# Patient Record
Sex: MALE | Race: BLACK OR AFRICAN AMERICAN | NOT HISPANIC OR LATINO
[De-identification: names, ages, dates, MRNs, and addresses within clinical notes are randomized per-mention and may not be internally consistent; named-entity substitution may affect disease eponyms.]

---

## 2020-05-12 PROBLEM — Z00.00 ENCOUNTER FOR PREVENTIVE HEALTH EXAMINATION: Status: ACTIVE | Noted: 2020-05-12

## 2020-05-20 PROBLEM — Z78.9 SOCIAL ALCOHOL USE: Status: ACTIVE | Noted: 2020-05-20

## 2020-05-26 ENCOUNTER — APPOINTMENT (OUTPATIENT)
Dept: UROLOGY | Facility: CLINIC | Age: 20
End: 2020-05-26
Payer: COMMERCIAL

## 2020-05-26 DIAGNOSIS — Z78.9 OTHER SPECIFIED HEALTH STATUS: ICD-10-CM

## 2020-06-16 ENCOUNTER — APPOINTMENT (OUTPATIENT)
Dept: UROLOGY | Facility: CLINIC | Age: 20
End: 2020-06-16
Payer: COMMERCIAL

## 2020-06-16 VITALS
HEIGHT: 70 IN | TEMPERATURE: 98 F | BODY MASS INDEX: 35.79 KG/M2 | HEART RATE: 87 BPM | DIASTOLIC BLOOD PRESSURE: 82 MMHG | WEIGHT: 250 LBS | SYSTOLIC BLOOD PRESSURE: 131 MMHG

## 2020-06-16 DIAGNOSIS — Z80.9 FAMILY HISTORY OF MALIGNANT NEOPLASM, UNSPECIFIED: ICD-10-CM

## 2020-06-16 DIAGNOSIS — Z83.3 FAMILY HISTORY OF DIABETES MELLITUS: ICD-10-CM

## 2020-06-16 PROCEDURE — 99204 OFFICE O/P NEW MOD 45 MIN: CPT

## 2020-06-16 NOTE — ASSESSMENT
[FreeTextEntry1] : In summary 19 year old male with persistent painless gross hematuria since Dec 2019. Work up included abdominal angiogram (report not available), negative CT stone protocol, cysto positive for blood at left UCO. No hx of stone disease, not sexually active.\par \par 1. Obtain UA, UCx today\par 2. Schedule CT Angiogram A/P, CT Urogram studies\par 3. Serum CBC and BMP today \par 4. F/U to discuss results, review images and finalize clinical plan.\par \par Thank you very much for allowing me to assist in the care of this patient. Should you have any additional questions or concerns please do not hesitate to contact me.\par \par Sincerely,\par \par Ab Bolivar D.O.\par  of Urology and Radiology\par  of Urology at [facility]\par System Director for Prostate Cancer\par [facility] [address] [place] [address], [address]\par Phone: [phone]\par

## 2020-06-16 NOTE — HISTORY OF PRESENT ILLNESS
[FreeTextEntry1] : Dear Dr. Val Garcia (Urologist at [facility])\par \par Thank you for your referral.\par \par Chief Complaint: painless, gross hematuria\par Date of first visit: 05/26/2020\par Occupation: Student CS\par \par CASE VELAZQUEZ  is a 19 year old AA male ~ who presents for an evaluation for painless gross hematuria. \par He reports that while he was in [place], he had spontaneous gross hematuria, characterized by red and dark brown color, since mid Dec 2019. He had a work up locally that included angiogram (negative) and cystoscopy revealed "blood at left UO." He saw Urologist in [place] who recommended to have Ureteroscopy on the left, but due to Corona distancing and precautions, this was placed on hold. He also saw a local urologist her referred him here.\par \par Pt underwent Bladder, Prostate and Kidney U/S (12/2019), which reported PVR 5 cc, 11.2 cc prostate volume and normal size kidneys. In addition, he had a CT stone A/P scan from 12/20/2019, which reported no urinary calculi or hydro. He recalls angiogram, but no details or report available. He does not recall having CT Urogram.\par \par He reports still having most times light red to brown urine color with occasional small clots, less in the morning and worse in the pm hours. Denies dysuria, denies flank pain, no N/V, no recent abdominal or back blunt trauma, reports occasional suprapubic pressure. Not sexually active\par \par Denies past UTIs, kidney stones. Family hx negative.\par \par 05/26/2020 \par IPSS 1 QOL 3 \par KALYANI 5\par \par The patient denies fevers, chills, nausea and or vomiting and no unexplained weight loss.\par \par All pertinent laboratory, films and physician notes were reviewed.  Questionnaire results were discussed with patient.\par \par

## 2020-06-16 NOTE — PHYSICAL EXAM
[General Appearance - Well Developed] : well developed [General Appearance - Well Nourished] : well nourished [Normal Appearance] : normal appearance [Well Groomed] : well groomed [Heart Rate And Rhythm] : Heart rate and rhythm were normal [General Appearance - In No Acute Distress] : no acute distress [] : no respiratory distress [Abdomen Tenderness] : non-tender [Costovertebral Angle Tenderness] : no ~M costovertebral angle tenderness [Abdomen Soft] : soft [Skin Color & Pigmentation] : normal skin color and pigmentation [Normal Station and Gait] : the gait and station were normal for the patient's age [Skin Lesions] : no skin lesions [Oriented To Time, Place, And Person] : oriented to person, place, and time [Affect] : the affect was normal [Mood] : the mood was normal [Not Anxious] : not anxious [Inguinal Lymph Nodes Enlarged Bilaterally] : inguinal [Urethral Meatus] : meatus normal [Penis Abnormality] : normal circumcised penis [Scrotum] : the scrotum was normal [Epididymis] : the epididymides were normal [Testes Mass (___cm)] : there were no testicular masses [Testes Tenderness] : no tenderness of the testes [No Focal Deficits] : no focal deficits [FreeTextEntry1] : Rectal deferred

## 2020-06-17 LAB
ANION GAP SERPL CALC-SCNC: 15 MMOL/L
APPEARANCE: ABNORMAL
BACTERIA: NEGATIVE
BASOPHILS # BLD AUTO: 0.04 K/UL
BASOPHILS NFR BLD AUTO: 0.6 %
BILIRUBIN URINE: NEGATIVE
BLOOD URINE: ABNORMAL
BUN SERPL-MCNC: 11 MG/DL
CALCIUM SERPL-MCNC: 9.8 MG/DL
CHLORIDE SERPL-SCNC: 102 MMOL/L
CO2 SERPL-SCNC: 23 MMOL/L
COLOR: ABNORMAL
CREAT SERPL-MCNC: 1 MG/DL
EOSINOPHIL # BLD AUTO: 0.04 K/UL
EOSINOPHIL NFR BLD AUTO: 0.6 %
GLUCOSE QUALITATIVE U: NEGATIVE
GLUCOSE SERPL-MCNC: 89 MG/DL
HCT VFR BLD CALC: 50.2 %
HGB BLD-MCNC: 16.1 G/DL
HYALINE CASTS: 0 /LPF
IMM GRANULOCYTES NFR BLD AUTO: 0.2 %
KETONES URINE: NEGATIVE
LEUKOCYTE ESTERASE URINE: NEGATIVE
LYMPHOCYTES # BLD AUTO: 1.87 K/UL
LYMPHOCYTES NFR BLD AUTO: 29.3 %
MAN DIFF?: NORMAL
MCHC RBC-ENTMCNC: 28 PG
MCHC RBC-ENTMCNC: 32.1 GM/DL
MCV RBC AUTO: 87.3 FL
MICROSCOPIC-UA: NORMAL
MONOCYTES # BLD AUTO: 0.38 K/UL
MONOCYTES NFR BLD AUTO: 5.9 %
NEUTROPHILS # BLD AUTO: 4.05 K/UL
NEUTROPHILS NFR BLD AUTO: 63.4 %
NITRITE URINE: POSITIVE
PH URINE: 7
PLATELET # BLD AUTO: 243 K/UL
POTASSIUM SERPL-SCNC: 4.4 MMOL/L
PROTEIN URINE: ABNORMAL
RBC # BLD: 5.75 M/UL
RBC # FLD: 12.2 %
RED BLOOD CELLS URINE: >720 /HPF
SODIUM SERPL-SCNC: 141 MMOL/L
SPECIFIC GRAVITY URINE: 1.01
SQUAMOUS EPITHELIAL CELLS: 0 /HPF
UROBILINOGEN URINE: NORMAL
WBC # FLD AUTO: 6.39 K/UL
WHITE BLOOD CELLS URINE: 5 /HPF

## 2020-06-18 LAB — BACTERIA UR CULT: NORMAL

## 2020-06-24 ENCOUNTER — TRANSCRIPTION ENCOUNTER (OUTPATIENT)
Age: 20
End: 2020-06-24

## 2020-07-01 ENCOUNTER — RESULT REVIEW (OUTPATIENT)
Age: 20
End: 2020-07-01

## 2020-07-01 ENCOUNTER — OUTPATIENT (OUTPATIENT)
Dept: OUTPATIENT SERVICES | Facility: HOSPITAL | Age: 20
LOS: 1 days | End: 2020-07-01
Payer: COMMERCIAL

## 2020-07-01 ENCOUNTER — APPOINTMENT (OUTPATIENT)
Dept: CT IMAGING | Facility: HOSPITAL | Age: 20
End: 2020-07-01

## 2020-07-01 PROCEDURE — 74178 CT ABD&PLV WO CNTR FLWD CNTR: CPT | Mod: 26

## 2020-07-02 PROCEDURE — 74178 CT ABD&PLV WO CNTR FLWD CNTR: CPT

## 2020-07-27 ENCOUNTER — APPOINTMENT (OUTPATIENT)
Dept: UROLOGY | Facility: CLINIC | Age: 20
End: 2020-07-27
Payer: COMMERCIAL

## 2020-07-27 PROCEDURE — 99213 OFFICE O/P EST LOW 20 MIN: CPT | Mod: 95

## 2020-07-27 NOTE — ASSESSMENT
[FreeTextEntry1] : In summary, 19 year old male with persistent painless gross hematuria since Dec 2019 on the left side. Work up on outside included abdominal angiogram (report not available), negative, CT stone protocol negative, outside cysto reportedly positive for blood at the left UO. Recent CT Urogram with venous phase - negative. No hx of stone disease, not sexually active.\par \par No Pseudoaneurysms, No Nut Crackers, no visible filling defects associated with his long standing asymptomatic gross hematuria.\par \par 1. Cystoscopy, left ureteroscopy, possible laser, possible brush biopsy, possible selective cytology, fulguration, retrograde pyelogram, possible stent placement at [facility]. +Flouro.\par 2. Labs on outside in [place], email to patient by Rukhsana.\par 3. No Medical Clearance\par 4. Coagulation tests\par 5. Booking form opened\par 6.  Rukhsana to call patient to help set up\par \par Thank you very much for allowing me to assist in the care of this patient. Should you have any additional questions or concerns please do not hesitate to contact me.\par \par Sincerely,\par \par Ab Bolivar D.O.\par  of Urology and Radiology\par  of Urology at [facility]\par System Director for Prostate Cancer\par [address] [place] [address], [address]\par Phone: [phone]

## 2020-07-27 NOTE — HISTORY OF PRESENT ILLNESS
[Other Location: e.g. School (Enter Location, City,State)___] : at [unfilled], at the time of the visit. [FreeTextEntry1] : Chief Complaint: follow up, gross hematuria\par Date of first visit: 06/16/2020\par \par This visit was provided via telehealth using real-time 2-way audio, visual technology. The patient, CASE VELAZQUEZ, was located at home, [address] [place] [address], at the time of the visit. The provider, AARON COLES, was located at [place] at the time of the visit. The patient, CASE VELAZQUEZ, and Provider participated in the telehealth encounter.\par Verbal consent given on July 27, 2020 by the patient.\par \par The patient-doctor relationship has been established in a face to face fashion via real time video/audio HIPAA compliant communication using telemedicine software. The patient's identity has been confirmed. The patient was previously emailed a copy of the telemedicine consent. They have had a chance to review and has now given verbal consent and has requested care to be assessed and treated through telemedicine and understands there maybe limitations in this process and they may need further follow up care in the office and or hospital settings.\par \par CASE VELAZQUEZ is a 19 year old AA male, hx of painless gross hematuria, who presents for a follow up, as he was asked to undergo CT Angiogram A/P, CT Urogram, labs and then come back to discuss results, review images and finalize clinical plan.\par \par CT Urogram 07/01/2020 - Negative for stones, renal mass, hydronephrosis, ureteric calculus.\par CT Angiogram???\par \par He reported in prior visit that while he was in [place], he had spontaneous gross hematuria, characterized by red and dark brown color, since mid Dec 2019. He had a work up locally that included angiogram (negative) and cystoscopy revealed "blood at left UO." He saw Urologist in [place] who recommended to have Ureteroscopy on the left, but due to Corona distancing and precautions, this was placed on hold. He also saw a local urologist her referred him here.\par \par Pt underwent Bladder, Prostate and Kidney U/S (12/2019), which reported PVR 5 cc, 11.2 cc prostate volume and normal size kidneys. In addition, he had a CT stone A/P scan from 12/20/2019, which reported no urinary calculi or hydro. He recalls angiogram, but no details or report available. He does not recall having CT Urogram.\par \par He reported still having most times light red to brown urine color with occasional small clots, less in the morning and worse in the pm hours. Denies dysuria, denies flank pain, no N/V, no recent abdominal or back blunt trauma, reports occasional suprapubic pressure. Not sexually active\par \par Denies past UTIs, kidney stones. Family hx negative.\par \par 07/06/2020 \par IPSS [] QOL [] \par KALYANI [] \par EPIC 26 [] \par \par The patient denies fevers, chills, nausea and or vomiting and no unexplained weight loss.\par \par All pertinent laboratory, films and physician notes were reviewed.  Questionnaire results were discussed with patient.\par \par

## 2020-07-27 NOTE — PHYSICAL EXAM
[General Appearance - Well Developed] : well developed [Well Groomed] : well groomed [General Appearance - In No Acute Distress] : no acute distress [Normal Appearance] : normal appearance [Skin Color & Pigmentation] : normal skin color and pigmentation [] : no respiratory distress [Heart Rate And Rhythm] : Heart rate and rhythm were normal [Mood] : the mood was normal [Affect] : the affect was normal [Oriented To Time, Place, And Person] : oriented to person, place, and time [Not Anxious] : not anxious [No Focal Deficits] : no focal deficits

## 2020-08-19 LAB
ANION GAP SERPL CALC-SCNC: 13 MMOL/L
APTT BLD: 36.9 SEC
BASOPHILS # BLD AUTO: 0.04 K/UL
BASOPHILS NFR BLD AUTO: 0.8 %
BUN SERPL-MCNC: 10 MG/DL
CALCIUM SERPL-MCNC: 10.2 MG/DL
CHLORIDE SERPL-SCNC: 104 MMOL/L
CO2 SERPL-SCNC: 27 MMOL/L
CREAT SERPL-MCNC: 1.17 MG/DL
EOSINOPHIL # BLD AUTO: 0.04 K/UL
EOSINOPHIL NFR BLD AUTO: 0.8 %
GLUCOSE SERPL-MCNC: 94 MG/DL
HCT VFR BLD CALC: 49.9 %
HGB BLD-MCNC: 16.2 G/DL
IMM GRANULOCYTES NFR BLD AUTO: 0 %
INR PPP: 1.06 RATIO
LYMPHOCYTES # BLD AUTO: 2.12 K/UL
LYMPHOCYTES NFR BLD AUTO: 40.5 %
MAN DIFF?: NORMAL
MCHC RBC-ENTMCNC: 27.9 PG
MCHC RBC-ENTMCNC: 32.5 GM/DL
MCV RBC AUTO: 86 FL
MONOCYTES # BLD AUTO: 0.44 K/UL
MONOCYTES NFR BLD AUTO: 8.4 %
NEUTROPHILS # BLD AUTO: 2.6 K/UL
NEUTROPHILS NFR BLD AUTO: 49.5 %
PLATELET # BLD AUTO: 239 K/UL
POTASSIUM SERPL-SCNC: 4.5 MMOL/L
PT BLD: 12.5 SEC
RBC # BLD: 5.8 M/UL
RBC # FLD: 12.2 %
SODIUM SERPL-SCNC: 143 MMOL/L
WBC # FLD AUTO: 5.24 K/UL

## 2020-08-20 LAB
APPEARANCE: ABNORMAL
BACTERIA UR CULT: NORMAL
BACTERIA UR CULT: NORMAL
BACTERIA: NEGATIVE
BILIRUBIN URINE: NEGATIVE
BLOOD URINE: ABNORMAL
COLOR: ABNORMAL
GLUCOSE QUALITATIVE U: NEGATIVE
HYALINE CASTS: 0 /LPF
KETONES URINE: NEGATIVE
LEUKOCYTE ESTERASE URINE: NEGATIVE
MICROSCOPIC-UA: NORMAL
NITRITE URINE: NEGATIVE
PH URINE: 6.5
PROTEIN URINE: ABNORMAL
RED BLOOD CELLS URINE: >720 /HPF
SPECIFIC GRAVITY URINE: 1.02
SQUAMOUS EPITHELIAL CELLS: 0 /HPF
UROBILINOGEN URINE: NORMAL
VWF:RCO ACT/NOR PPP PL AGG: 70 %
WHITE BLOOD CELLS URINE: 6 /HPF

## 2020-08-26 ENCOUNTER — TRANSCRIPTION ENCOUNTER (OUTPATIENT)
Age: 20
End: 2020-08-26

## 2020-08-27 ENCOUNTER — APPOINTMENT (OUTPATIENT)
Dept: UROLOGY | Facility: AMBULATORY SURGERY CENTER | Age: 20
End: 2020-08-27

## 2020-08-27 ENCOUNTER — OUTPATIENT (OUTPATIENT)
Dept: OUTPATIENT SERVICES | Facility: HOSPITAL | Age: 20
LOS: 1 days | Discharge: HOME | End: 2020-08-27
Payer: COMMERCIAL

## 2020-08-27 PROCEDURE — 52351 CYSTOURETERO & OR PYELOSCOPE: CPT

## 2020-08-27 RX ORDER — AZTREONAM 2 G
1 VIAL (EA) INJECTION
Qty: 20 | Refills: 0
Start: 2020-08-27 | End: 2020-09-05

## 2020-09-04 NOTE — PHYSICAL EXAM
[General Appearance - Well Developed] : well developed [Normal Appearance] : normal appearance [Well Groomed] : well groomed [General Appearance - In No Acute Distress] : no acute distress [Abdomen Soft] : soft [Abdomen Tenderness] : non-tender [Skin Color & Pigmentation] : normal skin color and pigmentation [Heart Rate And Rhythm] : Heart rate and rhythm were normal [] : no respiratory distress [Oriented To Time, Place, And Person] : oriented to person, place, and time [Affect] : the affect was normal [Mood] : the mood was normal [Not Anxious] : not anxious [Normal Station and Gait] : the gait and station were normal for the patient's age [No Focal Deficits] : no focal deficits [Inguinal Lymph Nodes Enlarged Bilaterally] : inguinal

## 2020-09-08 ENCOUNTER — LABORATORY RESULT (OUTPATIENT)
Age: 20
End: 2020-09-08

## 2020-09-08 ENCOUNTER — APPOINTMENT (OUTPATIENT)
Dept: UROLOGY | Facility: CLINIC | Age: 20
End: 2020-09-08
Payer: COMMERCIAL

## 2020-09-08 PROCEDURE — 99213 OFFICE O/P EST LOW 20 MIN: CPT

## 2020-09-08 NOTE — HISTORY OF PRESENT ILLNESS
[Other Location: e.g. School (Enter Location, City,State)___] : at [unfilled], at the time of the visit. 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 The left ureter was very narrow and therefore difficult to traverse with the URS.  Therefore a stent was placed on Aug 27th.  The bleeding actually improved on 9/5/20.  The stent maybe tamponading ureteral bleeding.  \par \par CT Urogram 07/01/2020 - Negative for stones, renal mass, hydronephrosis, ureteric calculus.\par \par He reported in prior visit that while he was in [place], he had spontaneous gross hematuria, characterized by red and dark brown color, since mid Dec 2019. He had a work up locally that included angiogram (negative) and cystoscopy revealed "blood at left UO." He saw Urologist in [place] who recommended to have Ureteroscopy on the left, but due to Corona distancing and precautions, this was placed on hold. He also saw a local urologist her referred him here.\par \par Pt underwent Bladder, Prostate and Kidney U/S (12/2019), which reported PVR 5 cc, 11.2 cc prostate volume and normal size kidneys. In addition, he had a CT stone A/P scan from 12/20/2019, which reported no urinary calculi or hydro. He recalls angiogram, but no details or report available. He does not recall having CT Urogram.\par \par He reported still having most times light red to brown urine color with occasional small clots, less in the morning and worse in the pm hours. Denies dysuria, denies flank pain, no N/V, no recent abdominal or back blunt trauma, reports occasional suprapubic pressure. Not sexually active\par \par Denies past UTIs, kidney stones. Family hx negative.\par \par 09/08/2020 \par IPSS  QOL  \par KALYANI\par \par The patient denies fevers, chills, nausea and or vomiting and no unexplained weight loss.\par \par All pertinent laboratory, films and physician notes were reviewed.  Questionnaire results were discussed with patient.

## 2020-09-08 NOTE — ASSESSMENT
[FreeTextEntry1] : In summary, 19 year old male with persistent painless gross hematuria since Dec 2019 on the left side - OR 8/27 left ureteral narrowing with focal bleeding from left UO. \par \par Previous work up on outside included abdominal angiogram (report not available), negative, CT stone protocol negative, outside cysto reportedly positive for blood at the left UO. Recent CT Urogram with venous phase - negative. No hx of stone disease, not sexually active.  \par \par No Pseudoaneurysms, No Nut Crackers, no visible filling defects associated with his long standing asymptomatic gross hematuria.\par \par 1. Cystoscopy, left ureteroscopy, possible laser, possible brush biopsy, possible selective cytology, fulguration, retrograde pyelogram, possible stent placement at [facility]. +Flouro. (2nd Stage)\par 2. Labs today\par 3. No Medical Clearance\par 4. Hematology Bleeding Assessment Dr Yoni Keller and Briseida Ash [phone]\par 5. Booking form placed 8/27/20\par \par Thank you very much for allowing me to assist in the care of this patient. Should you have any additional questions or concerns please do not hesitate to contact me.\par \par Sincerely,\par \par Ab Bolivar D.O.\par  of Urology and Radiology\par  of Urology at [facility]\par System Director for Prostate Cancer\par [address] [place] [address], [address]\par Phone: [phone]

## 2020-09-14 ENCOUNTER — LABORATORY RESULT (OUTPATIENT)
Age: 20
End: 2020-09-14

## 2020-09-16 ENCOUNTER — TRANSCRIPTION ENCOUNTER (OUTPATIENT)
Age: 20
End: 2020-09-16

## 2020-09-17 ENCOUNTER — APPOINTMENT (OUTPATIENT)
Dept: UROLOGY | Facility: AMBULATORY SURGERY CENTER | Age: 20
End: 2020-09-17

## 2020-09-17 ENCOUNTER — OUTPATIENT (OUTPATIENT)
Dept: OUTPATIENT SERVICES | Facility: HOSPITAL | Age: 20
LOS: 1 days | Discharge: HOME | End: 2020-09-17
Payer: COMMERCIAL

## 2020-09-17 ENCOUNTER — RESULT REVIEW (OUTPATIENT)
Age: 20
End: 2020-09-17

## 2020-09-17 PROCEDURE — 52351 CYSTOURETERO & OR PYELOSCOPE: CPT

## 2020-09-17 PROCEDURE — 88305 TISSUE EXAM BY PATHOLOGIST: CPT | Mod: 26

## 2020-09-21 LAB — SURGICAL PATHOLOGY STUDY: SIGNIFICANT CHANGE UP

## 2020-12-22 ENCOUNTER — APPOINTMENT (OUTPATIENT)
Dept: UROLOGY | Facility: CLINIC | Age: 20
End: 2020-12-22
Payer: COMMERCIAL

## 2020-12-22 VITALS — TEMPERATURE: 98.7 F | HEART RATE: 86 BPM | SYSTOLIC BLOOD PRESSURE: 124 MMHG | DIASTOLIC BLOOD PRESSURE: 80 MMHG

## 2020-12-22 PROCEDURE — 99213 OFFICE O/P EST LOW 20 MIN: CPT

## 2020-12-22 PROCEDURE — 99072 ADDL SUPL MATRL&STAF TM PHE: CPT

## 2020-12-23 LAB
ANION GAP SERPL CALC-SCNC: 13 MMOL/L
APPEARANCE: ABNORMAL
BACTERIA: NEGATIVE
BASOPHILS # BLD AUTO: 0.05 K/UL
BASOPHILS NFR BLD AUTO: 0.8 %
BILIRUBIN URINE: NEGATIVE
BLOOD URINE: ABNORMAL
BUN SERPL-MCNC: 17 MG/DL
CALCIUM SERPL-MCNC: 10.1 MG/DL
CHLORIDE SERPL-SCNC: 105 MMOL/L
CO2 SERPL-SCNC: 24 MMOL/L
COLOR: ABNORMAL
CREAT SERPL-MCNC: 1.4 MG/DL
EOSINOPHIL # BLD AUTO: 0.09 K/UL
EOSINOPHIL NFR BLD AUTO: 1.5 %
GLUCOSE QUALITATIVE U: NEGATIVE
GLUCOSE SERPL-MCNC: 97 MG/DL
HCT VFR BLD CALC: 50.8 %
HGB BLD-MCNC: 16.2 G/DL
HYALINE CASTS: 1 /LPF
IMM GRANULOCYTES NFR BLD AUTO: 0 %
KETONES URINE: NEGATIVE
LEUKOCYTE ESTERASE URINE: NEGATIVE
LYMPHOCYTES # BLD AUTO: 2.44 K/UL
LYMPHOCYTES NFR BLD AUTO: 40.5 %
MAN DIFF?: NORMAL
MCHC RBC-ENTMCNC: 27.5 PG
MCHC RBC-ENTMCNC: 31.9 GM/DL
MCV RBC AUTO: 86.1 FL
MICROSCOPIC-UA: NORMAL
MONOCYTES # BLD AUTO: 0.42 K/UL
MONOCYTES NFR BLD AUTO: 7 %
NEUTROPHILS # BLD AUTO: 3.02 K/UL
NEUTROPHILS NFR BLD AUTO: 50.2 %
NITRITE URINE: NEGATIVE
PH URINE: 6
PLATELET # BLD AUTO: 270 K/UL
POTASSIUM SERPL-SCNC: 4.4 MMOL/L
PROTEIN URINE: ABNORMAL
RBC # BLD: 5.9 M/UL
RBC # FLD: 12.6 %
RED BLOOD CELLS URINE: 206 /HPF
SODIUM SERPL-SCNC: 141 MMOL/L
SPECIFIC GRAVITY URINE: 1.02
SQUAMOUS EPITHELIAL CELLS: 1 /HPF
UROBILINOGEN URINE: NORMAL
WBC # FLD AUTO: 6.02 K/UL
WHITE BLOOD CELLS URINE: 5 /HPF

## 2020-12-23 NOTE — HISTORY OF PRESENT ILLNESS
[Other Location: e.g. School (Enter Location, City,State)___] : at [unfilled], at the time of the visit. 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 The left ureter was very narrow and therefore difficult to traverse with the URS.  Therefore a stent was placed on Aug 27th.  The bleeding actually improved on 9/5/20.  The stent maybe tamponading ureteral bleeding, 9/17/20 stent removed after OR evaluation and no abnormalities were appreciated.  He was seen at [facility] Hematology and work up was negative for bleeding disorders.\par \par CT Urogram 07/01/2020 - Negative for stones, renal mass, hydronephrosis, ureteric calculus.\par \par He reported in prior visit that while he was in [place], he had spontaneous gross hematuria, characterized by red and dark brown color, since mid Dec 2019. He had a work up locally that included angiogram (negative) and cystoscopy revealed "blood at left UO." He saw Urologist in [place] who recommended to have Ureteroscopy on the left, but due to Corona distancing and precautions, this was placed on hold. He also saw a local urologist her referred him here.\par \par Pt underwent Bladder, Prostate and Kidney U/S (12/2019), which reported PVR 5 cc, 11.2 cc prostate volume and normal size kidneys. In addition, he had a CT stone A/P scan from 12/20/2019, which reported no urinary calculi or hydro. He recalls angiogram, but no details or report available. \par \par He reported still having most times light red to brown urine color with occasional small clots, less in the morning and worse in the pm hours. Denies dysuria, denies flank pain, no N/V, no recent abdominal or back blunt trauma, reports occasional suprapubic pressure. Not sexually active\par \par Denies past UTIs, kidney stones. Family hx negative.\par \par The patient denies fevers, chills, nausea and or vomiting and no unexplained weight loss.\par \par All pertinent laboratory, films and physician notes were reviewed.  Questionnaire results were discussed with patient.

## 2020-12-23 NOTE — ASSESSMENT
[FreeTextEntry1] : In summary, 19 year old male with persistent painless gross hematuria since Dec 2019 on the left side - OR 8/27 left ureteral narrowing with focal bleeding from left UO, 3 month follow up. \par \par Previous work up on outside included abdominal angiogram (report not available), negative, CT stone protocol negative, outside cysto reportedly positive for blood at the left UO. Recent CT Urogram with venous phase - negative. No hx of stone disease, not sexually active.  \par \par No Pseudoaneurysms, No Nut Crackers, no visible filling defects associated with his long standing asymptomatic gross hematuria.\par \par 1. CT Angio\par 2. Urine Culture / UA\par 3.  CBC\par 4. Hematology work up negative\par 5. follow up after CT.\par 6. Cr increasing Recommend Nephrology w/u with Dr. Sherman.\par \par Thank you very much for allowing me to assist in the care of this patient. Should you have any additional questions or concerns please do not hesitate to contact me.\par \par Sincerely,\par \par Ab Bolivar D.O.\par  of Urology and Radiology\par  of Urology at [facility]\par System Director for Prostate Cancer\par [address], [address]\par Phone: [phone]

## 2020-12-24 LAB — BACTERIA UR CULT: NORMAL

## 2021-01-21 ENCOUNTER — APPOINTMENT (OUTPATIENT)
Dept: CT IMAGING | Facility: HOSPITAL | Age: 21
End: 2021-01-21

## 2021-01-21 ENCOUNTER — OUTPATIENT (OUTPATIENT)
Dept: OUTPATIENT SERVICES | Facility: HOSPITAL | Age: 21
LOS: 1 days | End: 2021-01-21
Payer: COMMERCIAL

## 2021-01-21 PROCEDURE — 74175 CTA ABDOMEN W/CONTRAST: CPT

## 2021-01-21 PROCEDURE — 74175 CTA ABDOMEN W/CONTRAST: CPT | Mod: 26

## 2021-01-25 ENCOUNTER — NON-APPOINTMENT (OUTPATIENT)
Age: 21
End: 2021-01-25

## 2021-02-02 ENCOUNTER — APPOINTMENT (OUTPATIENT)
Dept: UROLOGY | Facility: CLINIC | Age: 21
End: 2021-02-02
Payer: COMMERCIAL

## 2021-02-09 ENCOUNTER — APPOINTMENT (OUTPATIENT)
Dept: UROLOGY | Facility: CLINIC | Age: 21
End: 2021-02-09
Payer: COMMERCIAL

## 2021-02-09 VITALS
DIASTOLIC BLOOD PRESSURE: 79 MMHG | SYSTOLIC BLOOD PRESSURE: 139 MMHG | OXYGEN SATURATION: 96 % | HEART RATE: 82 BPM | TEMPERATURE: 97 F

## 2021-02-09 PROCEDURE — 99213 OFFICE O/P EST LOW 20 MIN: CPT

## 2021-02-09 PROCEDURE — 99072 ADDL SUPL MATRL&STAF TM PHE: CPT

## 2021-02-09 NOTE — ASSESSMENT
[FreeTextEntry1] : In summary, 19 year old male with persistent painless gross hematuria since Dec 2019 on the left side - OR 8/27 left ureteral narrowing with focal bleeding from left UO. Previous work up on outside included abdominal angiogram (report not available), negative, CT stone protocol negative, outside cysto reportedly positive for blood at the left UO. Recent CT Urogram with venous phase - negative. No hx of stone disease, not sexually active. No Pseudoaneurysms, No Nut Crackers, no visible filling defects associated with his long standing asymptomatic gross hematuria.\par \par CT angio 1/21/21 normal study. No etiology of hematuria identified (patient was experiencing hematuria at time of scan). Pt denies hematuria x 2 weeks\par \par 1. CT Angio negative\par 2. Hematology work up negative\par 3. Cr increasing. Nephrology referral placed, w/u with Dr. Sherman. Pt aware\par 4. Can re-image in 2-5 years\par 5. Advised on return precautions\par \par Follow up PRN\par \par Thank you very much for allowing me to assist in the care of this patient. Should you have any additional questions or concerns please do not hesitate to contact me.\par \par Sincerely,\par \par Ab Bolivar D.O.\par  of Urology and Radiology\par  of Urology at [facility]\par System Director for Prostate Cancer\par [address] [place] [address], [address]\par Phone: [phone]

## 2021-02-09 NOTE — PHYSICAL EXAM
[Costovertebral Angle Tenderness] : no ~M costovertebral angle tenderness [Urinary Bladder Findings] : the bladder was normal on palpation [Edema] : no peripheral edema [Exaggerated Use Of Accessory Muscles For Inspiration] : no accessory muscle use

## 2021-02-09 NOTE — HISTORY OF PRESENT ILLNESS
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 The left ureter was very narrow and therefore difficult to traverse with the URS.  Therefore a stent was placed on Aug 27th.  The bleeding actually improved on 9/5/20.  The stent maybe tamponading ureteral bleeding, 9/17/20 stent removed after OR evaluation and no abnormalities were appreciated.  He was seen at [facility] Hematology and work up was negative for bleeding disorders.\par \par Pt denies hematuria x 2 weeks, urine is clear and yellow. CT angio 1/21/21 normal study. No etiology of hematuria identified (patient was experiencing hematuria at time of scan)\par He needs to see Dr Sherman, nephrology, for elevated Crt (1.4). Referral was put in 1/25/21 and pt is aware.\par \par CT Urogram 07/01/2020 - Negative for stones, renal mass, hydronephrosis, ureteric calculus.\par \par He reported in prior visit that while he was in [place], he had spontaneous gross hematuria, characterized by red and dark brown color, since mid Dec 2019. He had a work up locally that included angiogram (negative) and cystoscopy revealed "blood at left UO." He saw Urologist in [place] who recommended to have Ureteroscopy on the left, but due to Corona distancing and precautions, this was placed on hold. He also saw a local urologist her referred him here.\par \par Pt underwent Bladder, Prostate and Kidney U/S (12/2019), which reported PVR 5 cc, 11.2 cc prostate volume and normal size kidneys. In addition, he had a CT stone A/P scan from 12/20/2019, which reported no urinary calculi or hydro. He recalls angiogram, but no details or report available. \par \par He reported still having most times light red to brown urine color with occasional small clots, less in the morning and worse in the pm hours. Denies dysuria, denies flank pain, no N/V, no recent abdominal or back blunt trauma, reports occasional suprapubic pressure. Not sexually active\par \par Denies past UTIs, kidney stones. Family hx negative.\par \par The patient denies fevers, chills, nausea and or vomiting and no unexplained weight loss.\par \par All pertinent laboratory, films and physician notes were reviewed.  Questionnaire results were discussed with patient. [Urinary Retention] : no urinary retention [Urinary Urgency] : no urinary urgency [Urinary Frequency] : no urinary frequency [Dysuria] : no dysuria [Hematuria - Gross] : no gross hematuria [Weight Loss] : no recent ~M [unfilled] weight loss [Fever] : no fever [Fatigue] : no fatigue [Nausea] : no nausea

## 2021-03-06 ENCOUNTER — NON-APPOINTMENT (OUTPATIENT)
Age: 21
End: 2021-03-06

## 2021-03-10 ENCOUNTER — NON-APPOINTMENT (OUTPATIENT)
Age: 21
End: 2021-03-10

## 2021-03-11 ENCOUNTER — APPOINTMENT (OUTPATIENT)
Dept: NEPHROLOGY | Facility: CLINIC | Age: 21
End: 2021-03-11
Payer: COMMERCIAL

## 2021-03-11 ENCOUNTER — LABORATORY RESULT (OUTPATIENT)
Age: 21
End: 2021-03-11

## 2021-03-11 VITALS — SYSTOLIC BLOOD PRESSURE: 109 MMHG | DIASTOLIC BLOOD PRESSURE: 72 MMHG

## 2021-03-11 VITALS — TEMPERATURE: 98.5 F

## 2021-03-11 PROCEDURE — 99072 ADDL SUPL MATRL&STAF TM PHE: CPT

## 2021-03-11 PROCEDURE — 36415 COLL VENOUS BLD VENIPUNCTURE: CPT

## 2021-03-11 PROCEDURE — 99204 OFFICE O/P NEW MOD 45 MIN: CPT | Mod: 25

## 2021-03-11 NOTE — HISTORY OF PRESENT ILLNESS
[FreeTextEntry1] : Kindly referred by Dr. Bolivar for gross hematuria and elevated creatinine.\par \par * Since December 2019 he has had multiple episode of painless gross hematuria, red/dark brown, with occasional clots. Workup reportedly included angiogram which was negative. A ureteroscopy August 2020 reportedly revealed ureteral stenosis in the distal 1/3 of ureter treated with stenting. Stent removed on 9/20. CT stone and urogram were negative. No evidence for nutcracker syndrome.The patient denies exposure to chronic NSAIDs, chronic PPIs, green smoothies, creatine, or herbal supplements. The patient denies a history of kidney stones or UTIs. No HTN or DM.  No recent renal ultrasound.  No TB exposures. Both kidneys were 9 cm on ultrasound. He denies prematurity or low birth weight. \par \par * His creatinine increased from 1.00 in June 2020 to 1.3 in Sep 2020 and 1.4 in Dec 2020. Urinalysis in Dec 2020 showed RBCs with 30 protein. He had a CT scan with contrast in Jan 21 2021. No weight lifting. \par \par For 2 weeks, he continues to have blood in the urine with occasional clots. No difficulty urinating. No nocturia. He last saw Dr. Hunt 1 month ago. \par \par

## 2021-03-11 NOTE — ASSESSMENT
[FreeTextEntry1] : # Elevated creatinine from 1.0 to 1.4.\par * Recheck labs, including cystatin C to establish whether he has decreased kidney function. (Creatinine of 1.0 may be an outlier.)\par * If decreased kidney function is present, will order nuclear renal scan to exclude obstruction (without hydro) related to stricture.\par * Recheck labs and quantify proteinuria.\par * A point of care renal ultrasound using the Butterfly iQ was performed and the images were personally reviewed. (The patient understands that this was a brief study to evaluate for hydronephrosis and not a complete renal ultrasound.) The ultrasound showed no significant hydronephrosis, kidneys not echogenic. \par * Check quant gold to evaluate for genitourinary TB.\par * Therapies for kidney disease: advised other evidence-based therapies including exercise, a plant-based low-oxalate diet, and 400 mcg folic acid daily\par * The patient has been counseled that chronic kidney disease is a significant condition and regular office followup with me (at least every 3 weeks for now) is important for monitoring and their health, and that it is their responsibility to make a follow up appointment.\par * The patient has been counseled never to stop taking their medications without discussing it with me or another doctor.\par * The patient has been counseled on avoiding NSAIDs.\par * The patient has been counseled on risk of acute renal failure and instructed to immediately call and speak with me or go immediately to ER with any severe symptoms, nausea, vomiting, diarrhea, chest pain, or shortness of breath.\par * A counseling information sheet has been given (today or previously). All their questions were answered.\par \par # Gross hematuria with clots.\par * This is very likely to be related to stricture and not intrinsic kidney disease (which doesn't present with clots).\par * He will make appointment for followup with Dr. Bolivar.

## 2021-03-11 NOTE — PHYSICAL EXAM
[General Appearance - Alert] : alert [General Appearance - In No Acute Distress] : in no acute distress [Neck Appearance] : the appearance of the neck was normal [Neck Cervical Mass (___cm)] : no neck mass was observed [Jugular Venous Distention Increased] : there was no jugular-venous distention [Thyroid Diffuse Enlargement] : the thyroid was not enlarged [Thyroid Nodule] : there were no palpable thyroid nodules [Auscultation Breath Sounds / Voice Sounds] : lungs were clear to auscultation bilaterally [Heart Rate And Rhythm] : heart rate was normal and rhythm regular [Heart Sounds] : normal S1 and S2 [Heart Sounds Gallop] : no gallops [Murmurs] : no murmurs [Heart Sounds Pericardial Friction Rub] : no pericardial rub [Edema] : there was no peripheral edema [Bowel Sounds] : normal bowel sounds [Abdomen Soft] : soft [Abdomen Tenderness] : non-tender [] : no hepato-splenomegaly [Abdomen Mass (___ Cm)] : no abdominal mass palpated [Cervical Lymph Nodes Enlarged Posterior Bilaterally] : posterior cervical [Cervical Lymph Nodes Enlarged Anterior Bilaterally] : anterior cervical [Supraclavicular Lymph Nodes Enlarged Bilaterally] : supraclavicular

## 2021-03-15 NOTE — PHYSICAL EXAM
[General Appearance - Well Developed] : well developed [Normal Appearance] : normal appearance [Well Groomed] : well groomed [General Appearance - In No Acute Distress] : no acute distress [Abdomen Soft] : soft [Abdomen Tenderness] : non-tender [Costovertebral Angle Tenderness] : no ~M costovertebral angle tenderness [Urinary Bladder Findings] : the bladder was normal on palpation [Skin Color & Pigmentation] : normal skin color and pigmentation [Edema] : no peripheral edema [] : no respiratory distress [Exaggerated Use Of Accessory Muscles For Inspiration] : no accessory muscle use [Oriented To Time, Place, And Person] : oriented to person, place, and time [Affect] : the affect was normal [Mood] : the mood was normal [Not Anxious] : not anxious [Normal Station and Gait] : the gait and station were normal for the patient's age [No Focal Deficits] : no focal deficits [Inguinal Lymph Nodes Enlarged Bilaterally] : inguinal

## 2021-03-16 ENCOUNTER — APPOINTMENT (OUTPATIENT)
Dept: UROLOGY | Facility: CLINIC | Age: 21
End: 2021-03-16
Payer: COMMERCIAL

## 2021-03-16 VITALS
DIASTOLIC BLOOD PRESSURE: 76 MMHG | SYSTOLIC BLOOD PRESSURE: 122 MMHG | TEMPERATURE: 98.2 F | WEIGHT: 240 LBS | HEART RATE: 79 BPM | BODY MASS INDEX: 34.36 KG/M2 | HEIGHT: 70 IN

## 2021-03-16 DIAGNOSIS — Z87.09 PERSONAL HISTORY OF OTHER DISEASES OF THE RESPIRATORY SYSTEM: ICD-10-CM

## 2021-03-16 PROCEDURE — 99072 ADDL SUPL MATRL&STAF TM PHE: CPT

## 2021-03-16 PROCEDURE — 99214 OFFICE O/P EST MOD 30 MIN: CPT

## 2021-03-20 LAB
25(OH)D3 SERPL-MCNC: 8.1 NG/ML
ALBUMIN SERPL ELPH-MCNC: 4.6 G/DL
ALP BLD-CCNC: 74 U/L
ALT SERPL-CCNC: 85 U/L
ANION GAP SERPL CALC-SCNC: 12 MMOL/L
APPEARANCE: ABNORMAL
AST SERPL-CCNC: 40 U/L
BASOPHILS # BLD AUTO: 0.05 K/UL
BASOPHILS NFR BLD AUTO: 0.7 %
BILIRUB SERPL-MCNC: 0.8 MG/DL
BILIRUBIN URINE: NEGATIVE
BLOOD URINE: ABNORMAL
BUN SERPL-MCNC: 15 MG/DL
CALCIUM SERPL-MCNC: 9.3 MG/DL
CALCIUM SERPL-MCNC: 9.3 MG/DL
CHLORIDE SERPL-SCNC: 105 MMOL/L
CHOLEST SERPL-MCNC: 159 MG/DL
CO2 SERPL-SCNC: 25 MMOL/L
COLOR: YELLOW
CREAT SERPL-MCNC: 1.11 MG/DL
CREAT SPEC-SCNC: 163 MG/DL
CREAT SPEC-SCNC: 163 MG/DL
CREAT/PROT UR: 0.1 RATIO
CYSTATIN C SERPL-MCNC: 0.68 MG/L
EOSINOPHIL # BLD AUTO: 0.07 K/UL
EOSINOPHIL NFR BLD AUTO: 1 %
ESTIMATED AVERAGE GLUCOSE: 111 MG/DL
FERRITIN SERPL-MCNC: 66 NG/ML
GFR/BSA.PRED SERPLBLD CYS-BASED-ARV: 132 ML/MIN
GLUCOSE QUALITATIVE U: NEGATIVE
GLUCOSE SERPL-MCNC: 90 MG/DL
HBA1C MFR BLD HPLC: 5.5 %
HCT VFR BLD CALC: 50 %
HDLC SERPL-MCNC: 36 MG/DL
HGB BLD-MCNC: 16.5 G/DL
IMM GRANULOCYTES NFR BLD AUTO: 0 %
KETONES URINE: NEGATIVE
LDLC SERPL CALC-MCNC: 106 MG/DL
LEUKOCYTE ESTERASE URINE: NEGATIVE
LYMPHOCYTES # BLD AUTO: 2.39 K/UL
LYMPHOCYTES NFR BLD AUTO: 35.3 %
M TB IFN-G BLD-IMP: NEGATIVE
MAN DIFF?: NORMAL
MCHC RBC-ENTMCNC: 28.2 PG
MCHC RBC-ENTMCNC: 33 GM/DL
MCV RBC AUTO: 85.3 FL
MICROALBUMIN 24H UR DL<=1MG/L-MCNC: 2.4 MG/DL
MICROALBUMIN/CREAT 24H UR-RTO: 15 MG/G
MONOCYTES # BLD AUTO: 0.56 K/UL
MONOCYTES NFR BLD AUTO: 8.3 %
NEUTROPHILS # BLD AUTO: 3.71 K/UL
NEUTROPHILS NFR BLD AUTO: 54.7 %
NITRITE URINE: NEGATIVE
NONHDLC SERPL-MCNC: 123 MG/DL
PARATHYROID HORMONE INTACT: 23 PG/ML
PH URINE: 7.5
PHOSPHATE SERPL-MCNC: 2.9 MG/DL
PLATELET # BLD AUTO: 218 K/UL
POTASSIUM SERPL-SCNC: 4.3 MMOL/L
PROT SERPL-MCNC: 7.5 G/DL
PROT UR-MCNC: 12 MG/DL
PROTEIN URINE: NORMAL
QUANTIFERON TB PLUS MITOGEN MINUS NIL: 8.28 IU/ML
QUANTIFERON TB PLUS NIL: 0.01 IU/ML
QUANTIFERON TB PLUS TB1 MINUS NIL: 0.01 IU/ML
QUANTIFERON TB PLUS TB2 MINUS NIL: 0 IU/ML
RBC # BLD: 5.86 M/UL
RBC # FLD: 13 %
SODIUM SERPL-SCNC: 142 MMOL/L
SPECIFIC GRAVITY URINE: 1.02
TRIGL SERPL-MCNC: 88 MG/DL
TSH SERPL-ACNC: 1.81 UIU/ML
UROBILINOGEN URINE: NORMAL
VIT B12 SERPL-MCNC: 818 PG/ML
WBC # FLD AUTO: 6.78 K/UL

## 2021-03-28 NOTE — HISTORY OF PRESENT ILLNESS
[FreeTextEntry1] : Chief Complaint: follow up post surgery, hx gross hematuria\par Date of first visit: 06/16/2020\par \par s/p Ureteroscopy 09/17, which revealed a several centimeter long area of ureteral stenosis at the distal one-third of the ureter.\par \par CASE VELAZQUEZ is a 19 year old AA male, hx of painless gross hematuria with occasional clots. I still feel with limited evidences this is position venous compression issue and that a stent help stopped the bleeding may support this given a low pressure bleed vs arterial or a mass.\par \par Key Clinical Facts.\par never blood on waking but occurs 3-4 hours later then is there all day.  \par Clots are yellow with clots or red clots\par Creatinine has returned to baseline.\par s/p left ureteroscopy (negative) - only finding is narrowed ureter but not obstructed 9/25/20 stent for passive dilation to help with URS placment.  Bleeding stopped with a stent.\par hematology workup negative for coagulopathies.\par \par CT angio 1/21/21 normal study. No etiology of hematuria identified (patient was experiencing hematuria at time of scan)\par CT Urogram 07/01/2020 - Negative for stones, renal mass, hydronephrosis, ureteric calculus.\par \par He initially in prior visit that while he was in [place], he had spontaneous gross hematuria, characterized by red and dark brown color, since mid Dec 2019. He had a work up locally that included angiogram (negative) and cystoscopy revealed "blood at left UO." He saw Urologist in [place] who recommended to have Ureteroscopy on the left, but due to Corona distancing and precautions, this was placed on hold. He also saw a local urologist her referred him here.\par \par Pt underwent Bladder, Prostate and Kidney U/S (12/2019), which reported PVR 5 cc, 11.2 cc prostate volume and normal size kidneys. In addition, he had a CT stone A/P scan from 12/20/2019, which reported no urinary calculi or hydro. He recalls angiogram, but no details or report available. \par \par He reported still having most times light red to brown urine color with occasional small clots, less in the morning and worse in the pm hours. Denies dysuria, denies flank pain, no N/V, no recent abdominal or back blunt trauma, reports occasional suprapubic pressure. Not sexually active\par \par Denies past UTIs, kidney stones. Family hx negative.\par \par The patient denies fevers, chills, nausea and or vomiting and no unexplained weight loss.\par \par All pertinent laboratory, films and physician notes were reviewed.  Questionnaire results were discussed with patient. [Urinary Retention] : no urinary retention [Urinary Urgency] : no urinary urgency [Urinary Frequency] : no urinary frequency [Dysuria] : no dysuria [Hematuria - Gross] : no gross hematuria [Fever] : no fever [Fatigue] : no fatigue [Nausea] : no nausea

## 2021-03-28 NOTE — ASSESSMENT
[FreeTextEntry1] : In summary, 19 year old male with persistent painless gross hematuria since Dec 2019 on the left side - OR 8/27 left ureteral narrowing with focal bleeding from left UO. Previous work up on outside included abdominal angiogram (report not available), negative, CT stone protocol negative, outside cysto reportedly positive for blood at the left UO. Recent CT Urogram with venous phase - negative. No hx of stone disease, not sexually active. No Pseudoaneurysms, No Nut Crackers, no visible filling defects associated with his long standing asymptomatic gross hematuria.\par \par CT angio 1/21/21 normal study. No etiology of hematuria identified (patient was experiencing hematuria at time of scan). Pt denies hematuria x 2 weeks\par \par 1. CT Angio negative\par 2. Hematology work up negative\par 3. Cr is stable\par 4. Can re-image in 2-5 years\par 5. NN RBC scan\par \par case discussed directly with LARISSA Blankenship and alessandro Sherman\par  \par Follow up PRN\par \par Thank you very much for allowing me to assist in the care of this patient. Should you have any additional questions or concerns please do not hesitate to contact me.\par \par Sincerely,\par \par Ab Bolivar D.O.\par  of Urology and Radiology\par  of Urology at [facility]\par System Director for Prostate Cancer\par [address] [place] [address], [address]\par Phone: [phone]

## 2021-04-02 ENCOUNTER — APPOINTMENT (OUTPATIENT)
Dept: NEPHROLOGY | Facility: CLINIC | Age: 21
End: 2021-04-02
Payer: COMMERCIAL

## 2021-04-02 VITALS — HEART RATE: 79 BPM | DIASTOLIC BLOOD PRESSURE: 68 MMHG | SYSTOLIC BLOOD PRESSURE: 108 MMHG

## 2021-04-02 PROCEDURE — 99072 ADDL SUPL MATRL&STAF TM PHE: CPT

## 2021-04-02 PROCEDURE — 99213 OFFICE O/P EST LOW 20 MIN: CPT

## 2021-04-02 NOTE — ASSESSMENT
[FreeTextEntry1] : # Gross hematuria of unclear cause.\par * Being evaluated for occult nutcracker syndrome with nuclear renal scan and standing dopplers.\par * No evidence of intrarenal cause and this is unlikely given clots.\par * Follow up within 1 - 2 months.\par \par

## 2021-04-02 NOTE — HISTORY OF PRESENT ILLNESS
[FreeTextEntry1] : Kindly referred by Dr. Bolivar for gross hematuria and elevated creatinine.\par \par * Intermittent gross hematuria with clots. No pain. * Creatinine normalized, decreased to 1.11. eGFR 132. \par \par Previous history (11Mar21): * Since December 2019 he has had multiple episode of painless gross hematuria, red/dark brown, with occasional clots. Workup reportedly included angiogram which was negative. A ureteroscopy August 2020 reportedly revealed ureteral stenosis in the distal 1/3 of ureter treated with stenting. Stent removed on 9/20. CT stone and urogram were negative. No evidence for nutcracker syndrome.The patient denies exposure to chronic NSAIDs, chronic PPIs, green smoothies, creatine, or herbal supplements. The patient denies a history of kidney stones or UTIs. No HTN or DM.  No recent renal ultrasound.  No TB exposures. Both kidneys were 9 cm on ultrasound. He denies prematurity or low birth weight. \par \par * His creatinine increased from 1.00 in June 2020 to 1.3 in Sep 2020 and 1.4 in Dec 2020. Urinalysis in Dec 2020 showed RBCs with 30 protein. He had a CT scan with contrast in Jan 21 2021. No weight lifting. \par \par For 2 weeks, he continues to have blood in the urine with occasional clots. No difficulty urinating. No nocturia. He last saw Dr. Hunt 1 month ago. \par \par

## 2021-04-05 ENCOUNTER — OUTPATIENT (OUTPATIENT)
Dept: OUTPATIENT SERVICES | Facility: HOSPITAL | Age: 21
LOS: 1 days | End: 2021-04-05
Payer: COMMERCIAL

## 2021-04-05 ENCOUNTER — RESULT REVIEW (OUTPATIENT)
Age: 21
End: 2021-04-05

## 2021-04-05 PROCEDURE — 78278 ACUTE GI BLOOD LOSS IMAGING: CPT | Mod: 26

## 2021-04-05 PROCEDURE — 78278 ACUTE GI BLOOD LOSS IMAGING: CPT

## 2021-04-05 PROCEDURE — A9560: CPT

## 2021-04-07 ENCOUNTER — NON-APPOINTMENT (OUTPATIENT)
Age: 21
End: 2021-04-07

## 2021-04-19 ENCOUNTER — OUTPATIENT (OUTPATIENT)
Dept: OUTPATIENT SERVICES | Facility: HOSPITAL | Age: 21
LOS: 1 days | End: 2021-04-19

## 2021-04-19 ENCOUNTER — APPOINTMENT (OUTPATIENT)
Dept: ULTRASOUND IMAGING | Facility: CLINIC | Age: 21
End: 2021-04-19
Payer: COMMERCIAL

## 2021-04-19 ENCOUNTER — RESULT REVIEW (OUTPATIENT)
Age: 21
End: 2021-04-19

## 2021-04-19 PROCEDURE — 76770 US EXAM ABDO BACK WALL COMP: CPT | Mod: 26

## 2021-04-30 ENCOUNTER — NON-APPOINTMENT (OUTPATIENT)
Age: 21
End: 2021-04-30

## 2021-05-07 ENCOUNTER — NON-APPOINTMENT (OUTPATIENT)
Age: 21
End: 2021-05-07

## 2021-05-07 NOTE — PHYSICAL EXAM
[General Appearance - Well Developed] : well developed [Abdomen Soft] : soft [Costovertebral Angle Tenderness] : no ~M costovertebral angle tenderness [Urinary Bladder Findings] : the bladder was normal on palpation [Skin Color & Pigmentation] : normal skin color and pigmentation [Edema] : no peripheral edema [] : no respiratory distress [Exaggerated Use Of Accessory Muscles For Inspiration] : no accessory muscle use [Oriented To Time, Place, And Person] : oriented to person, place, and time [Affect] : the affect was normal [Mood] : the mood was normal [Normal Station and Gait] : the gait and station were normal for the patient's age [No Focal Deficits] : no focal deficits [Inguinal Lymph Nodes Enlarged Bilaterally] : inguinal

## 2021-05-10 ENCOUNTER — APPOINTMENT (OUTPATIENT)
Dept: UROLOGY | Facility: CLINIC | Age: 21
End: 2021-05-10
Payer: COMMERCIAL

## 2021-05-10 PROCEDURE — 99213 OFFICE O/P EST LOW 20 MIN: CPT

## 2021-05-10 PROCEDURE — 99072 ADDL SUPL MATRL&STAF TM PHE: CPT

## 2021-05-11 LAB
ANION GAP SERPL CALC-SCNC: 12 MMOL/L
APPEARANCE: ABNORMAL
BACTERIA: NEGATIVE
BASOPHILS # BLD AUTO: 0.06 K/UL
BASOPHILS NFR BLD AUTO: 0.8 %
BILIRUBIN URINE: NEGATIVE
BLOOD URINE: ABNORMAL
BUN SERPL-MCNC: 11 MG/DL
CALCIUM SERPL-MCNC: 9.6 MG/DL
CHLORIDE SERPL-SCNC: 103 MMOL/L
CO2 SERPL-SCNC: 25 MMOL/L
COLOR: ABNORMAL
CREAT SERPL-MCNC: 1.04 MG/DL
EOSINOPHIL # BLD AUTO: 0.15 K/UL
EOSINOPHIL NFR BLD AUTO: 2.1 %
GLUCOSE QUALITATIVE U: NEGATIVE
GLUCOSE SERPL-MCNC: 88 MG/DL
HCT VFR BLD CALC: 46.9 %
HGB BLD-MCNC: 15.3 G/DL
HYALINE CASTS: 0 /LPF
IMM GRANULOCYTES NFR BLD AUTO: 0.3 %
KETONES URINE: NEGATIVE
LEUKOCYTE ESTERASE URINE: NEGATIVE
LYMPHOCYTES # BLD AUTO: 2.71 K/UL
LYMPHOCYTES NFR BLD AUTO: 37.4 %
MAN DIFF?: NORMAL
MCHC RBC-ENTMCNC: 27.7 PG
MCHC RBC-ENTMCNC: 32.6 GM/DL
MCV RBC AUTO: 85 FL
MICROSCOPIC-UA: NORMAL
MONOCYTES # BLD AUTO: 0.44 K/UL
MONOCYTES NFR BLD AUTO: 6.1 %
NEUTROPHILS # BLD AUTO: 3.86 K/UL
NEUTROPHILS NFR BLD AUTO: 53.3 %
NITRITE URINE: NEGATIVE
PH URINE: 6
PLATELET # BLD AUTO: 240 K/UL
POTASSIUM SERPL-SCNC: 4.3 MMOL/L
PROTEIN URINE: ABNORMAL
RBC # BLD: 5.52 M/UL
RBC # FLD: 12.4 %
RED BLOOD CELLS URINE: >720 /HPF
SODIUM SERPL-SCNC: 140 MMOL/L
SPECIFIC GRAVITY URINE: 1.02
SQUAMOUS EPITHELIAL CELLS: 1 /HPF
UROBILINOGEN URINE: NORMAL
WBC # FLD AUTO: 7.24 K/UL
WHITE BLOOD CELLS URINE: 5 /HPF

## 2021-05-12 LAB — BACTERIA UR CULT: NORMAL

## 2021-05-13 NOTE — HISTORY OF PRESENT ILLNESS
[FreeTextEntry1] : Chief Complaint: follow up post surgery, hx gross hematuria\par Date of first visit: 06/16/2020\par \par s/p Ureteroscopy 09/17, which revealed a several centimeter long area of ureteral stenosis at the distal one-third of the ureter.\par \par CASE VELAZQUEZ is a 19 year old AA male, hx of painless gross hematuria with occasional clots. I still feel with limited evidences this is position venous compression issue and that a stent help stopped the bleeding may support this given a low pressure bleed vs arterial or a mass.  The case was discussed with Drs Geo, Edgar and Phylicia.  The decision was made to instill silver nitrate solution to help stop the bleeding.  \par \par Key Clinical Facts.\par never blood on waking but occurs 3-4 hours later then is there all day.  \par Clots are yellow with clots or red clots\par Creatinine has returned to baseline.\par s/p left ureteroscopy (negative) - only finding is narrowed ureter but not obstructed 9/25/20 stent for passive dilation to help with URS placement.  Bleeding stopped with a stent.\par hematology workup negative for coagulopathies.\par \par CT angio 1/21/21 normal study. No etiology of hematuria identified (patient was experiencing hematuria at time of scan)\par CT Urogram 07/01/2020 - Negative for stones, renal mass, hydronephrosis, ureteric calculus.\par \par He initially in prior visit that while he was in [place], he had spontaneous gross hematuria, characterized by red and dark brown color, since mid Dec 2019. He had a work up locally that included angiogram (negative) and cystoscopy revealed "blood at left UO." He saw Urologist in [place] who recommended to have Ureteroscopy on the left, but due to Corona distancing and precautions, this was placed on hold. He also saw a local urologist her referred him here.\par \par Pt underwent Bladder, Prostate and Kidney U/S (12/2019), which reported PVR 5 cc, 11.2 cc prostate volume and normal size kidneys. In addition, he had a CT stone A/P scan from 12/20/2019, which reported no urinary calculi or hydro. He recalls angiogram, but no details or report available. \par \par He reported still having most times light red to brown urine color with occasional small clots, less in the morning and worse in the pm hours. Denies dysuria, denies flank pain, no N/V, no recent abdominal or back blunt trauma, reports occasional suprapubic pressure. Not sexually active\par \par Denies past UTIs, kidney stones. Family hx negative.\par \par The patient denies fevers, chills, nausea and or vomiting and no unexplained weight loss.\par \par All pertinent laboratory, films and physician notes were reviewed.  Questionnaire results were discussed with patient. [Urinary Retention] : no urinary retention [Urinary Urgency] : no urinary urgency [Urinary Frequency] : no urinary frequency [Dysuria] : no dysuria [Hematuria - Gross] : no gross hematuria [Fever] : no fever [Fatigue] : no fatigue [Nausea] : no nausea

## 2021-05-13 NOTE — ASSESSMENT
[FreeTextEntry1] : In summary, 19 year old male with persistent painless gross hematuria since Dec 2019 on the left side - OR 8/27 left ureteral narrowing with focal bleeding from left UO. Previous work up on outside included abdominal angiogram (report not available), negative, CT stone protocol negative, outside cysto reportedly positive for blood at the left UO. Recent CT Urogram with venous phase - negative. No hx of stone disease, not sexually active. No Pseudoaneurysms, No Nut Crackers, no visible filling defects associated with his long standing asymptomatic gross hematuria.\par \par CT angio 1/21/21 normal study. No etiology of hematuria identified (patient was experiencing hematuria at time of scan). Pt denies hematuria x 2 weeks\par \par 1. CT Angio negative\par 2. Hematology work up negative\par 3. Cr is stable\par 4. NN RBC scan - Negative, Doppler US left kidney negative.\par 5. refractory hematuria - LEFT Side - case discussed with team at [facility] (silver nitrate instillation and stent to avoid obstruction post treatment) stent on string - remove in office following monday\par \par No medical clearance needed/no contraindication for sedation.\par \par \par Thank you very much for allowing me to assist in the care of this patient. Should you have any additional questions or concerns please do not hesitate to contact me.\par \par Sincerely,\par \par Ab Bolivar D.O.\par  of Urology and Radiology\par  of Urology at [facility]\par System Director for Prostate Cancer\par [address], [address]\par Phone: [phone]

## 2021-05-14 ENCOUNTER — LABORATORY RESULT (OUTPATIENT)
Age: 21
End: 2021-05-14

## 2021-05-14 ENCOUNTER — APPOINTMENT (OUTPATIENT)
Dept: NEPHROLOGY | Facility: CLINIC | Age: 21
End: 2021-05-14
Payer: COMMERCIAL

## 2021-05-14 VITALS — WEIGHT: 250 LBS | TEMPERATURE: 96.6 F

## 2021-05-14 VITALS — SYSTOLIC BLOOD PRESSURE: 101 MMHG | DIASTOLIC BLOOD PRESSURE: 62 MMHG

## 2021-05-14 DIAGNOSIS — L98.9 DISORDER OF THE SKIN AND SUBCUTANEOUS TISSUE, UNSPECIFIED: ICD-10-CM

## 2021-05-14 DIAGNOSIS — R79.89 OTHER SPECIFIED ABNORMAL FINDINGS OF BLOOD CHEMISTRY: ICD-10-CM

## 2021-05-14 PROCEDURE — 99214 OFFICE O/P EST MOD 30 MIN: CPT

## 2021-05-14 PROCEDURE — 99072 ADDL SUPL MATRL&STAF TM PHE: CPT

## 2021-05-14 NOTE — ASSESSMENT
[FreeTextEntry1] : # Persistent gross hematuria of unclear cause.\par * No evidence of vascular abnormality in kidney or nutcracker syndrome.\par * The presence of clots makes it extremely unlikely that the source of the bleeding is IgA nephropathy or another type of glomerulophritis, as this almost always does not cause clots. The risks of a renal biopsy therefore outweigh the benefits.\par * Recheck U/A and urine protein. Lack of proteinuria also argues against glomerular bleeding.\par * Follow up in 4 - 6 months. \par \par # Skin lesion, possibly a lipoma.\par * Ultrasound of skin lesion to characterize. They were instructed that this referral is important for their health and that it is their responsibility to make and keep this appointment. All their questions were answered. \par * He will go to ER immediately with any significant pain or fever. \par * CT scan in January 2021 did not show any evidence for lymphadenopathy or other lesions. \par

## 2021-05-14 NOTE — PHYSICAL EXAM
[General Appearance - Alert] : alert [General Appearance - In No Acute Distress] : in no acute distress [Sclera] : the sclera and conjunctiva were normal [PERRL With Normal Accommodation] : pupils were equal in size, round, and reactive to light [Extraocular Movements] : extraocular movements were intact [Auscultation Breath Sounds / Voice Sounds] : lungs were clear to auscultation bilaterally [Heart Rate And Rhythm] : heart rate was normal and rhythm regular [Heart Sounds] : normal S1 and S2 [Heart Sounds Gallop] : no gallops [Murmurs] : no murmurs [Heart Sounds Pericardial Friction Rub] : no pericardial rub [Edema] : there was no peripheral edema [Bowel Sounds] : normal bowel sounds [Abdomen Soft] : soft [Abdomen Tenderness] : non-tender [] : no hepato-splenomegaly [Abdomen Mass (___ Cm)] : no abdominal mass palpated [Cervical Lymph Nodes Enlarged Posterior Bilaterally] : posterior cervical [Cervical Lymph Nodes Enlarged Anterior Bilaterally] : anterior cervical [Supraclavicular Lymph Nodes Enlarged Bilaterally] : supraclavicular [Axillary Lymph Nodes Enlarged Bilaterally] : axillary [Femoral Lymph Nodes Enlarged Bilaterally] : femoral [Inguinal Lymph Nodes Enlarged Bilaterally] : inguinal [FreeTextEntry1] : SUPERFICIAL RUBBERY SLIGHTLY TENDER 1 CM LEFT LOWER QUADRANT SKIN LESION WITHOUT ERYTHEMA.

## 2021-05-14 NOTE — HISTORY OF PRESENT ILLNESS
[FreeTextEntry1] : Kindly referred by Dr. Bolivar for gross hematuria.\par \par * Chronic gross hematuria with clots. No pain.No dysuria. Creatinine normalized. No evidence for nutcracker syndrome. * He noted a "bump" in his LLQ 1 week ago which is slightly tender to palpation. \par \par Previous history ([id number]): * Intermittent gross hematuria with clots. No pain. * Creatinine normalized, decreased to 1.11. eGFR 132. \par \par Previous history (11Mar21): * Since December 2019 he has had multiple episode of painless gross hematuria, red/dark brown, with occasional clots. Workup reportedly included angiogram which was negative. A ureteroscopy August 2020 reportedly revealed ureteral stenosis in the distal 1/3 of ureter treated with stenting. Stent removed on 9/20. CT stone and urogram were negative. No evidence for nutcracker syndrome.The patient denies exposure to chronic NSAIDs, chronic PPIs, green smoothies, creatine, or herbal supplements. The patient denies a history of kidney stones or UTIs. No HTN or DM.  No recent renal ultrasound.  No TB exposures. Both kidneys were 9 cm on ultrasound. He denies prematurity or low birth weight. \par \par * His creatinine increased from 1.00 in June 2020 to 1.3 in Sep 2020 and 1.4 in Dec 2020. Urinalysis in Dec 2020 showed RBCs with 30 protein. He had a CT scan with contrast in Jan 21 2021. No weight lifting. \par \par For 2 weeks, he continues to have blood in the urine with occasional clots. No difficulty urinating. No nocturia. He last saw Dr. Hunt 1 month ago. \par \par

## 2021-05-16 LAB
APPEARANCE: ABNORMAL
BILIRUBIN URINE: ABNORMAL
BLOOD URINE: ABNORMAL
COLOR: ABNORMAL
GLUCOSE QUALITATIVE U: NEGATIVE
KETONES URINE: NEGATIVE
LEUKOCYTE ESTERASE URINE: NEGATIVE
NITRITE URINE: POSITIVE
PH URINE: 6.5
PROTEIN URINE: ABNORMAL
SPECIFIC GRAVITY URINE: 1.02
UROBILINOGEN URINE: NORMAL

## 2021-05-24 ENCOUNTER — LABORATORY RESULT (OUTPATIENT)
Age: 21
End: 2021-05-24

## 2021-05-26 ENCOUNTER — TRANSCRIPTION ENCOUNTER (OUTPATIENT)
Age: 21
End: 2021-05-26

## 2021-05-27 ENCOUNTER — OUTPATIENT (OUTPATIENT)
Dept: OUTPATIENT SERVICES | Facility: HOSPITAL | Age: 21
LOS: 1 days | Discharge: HOME | End: 2021-05-27
Payer: COMMERCIAL

## 2021-05-27 ENCOUNTER — APPOINTMENT (OUTPATIENT)
Dept: UROLOGY | Facility: AMBULATORY SURGERY CENTER | Age: 21
End: 2021-05-27

## 2021-05-27 PROCEDURE — 52332 CYSTOSCOPY AND TREATMENT: CPT | Mod: LT

## 2021-05-27 NOTE — BRIEF OPERATIVE NOTE - NSICDXBRIEFPROCEDURE_GEN_ALL_CORE_FT
PROCEDURES:  Cystoscopy with retrograde pyelography with insertion of left ureteral stent 27-May-2021 14:08:01  Maury Sandoval

## 2021-05-27 NOTE — BRIEF OPERATIVE NOTE - OPERATION/FINDINGS
Gross hematuria seen emanating from left ureteral orifice; clear yellow urine from right ureteral orifice. Silver nitrate instillation of 7 minutes into left upper tract. 6-24 left ureteral stent.

## 2021-05-28 ENCOUNTER — NON-APPOINTMENT (OUTPATIENT)
Age: 21
End: 2021-05-28

## 2021-06-01 ENCOUNTER — APPOINTMENT (OUTPATIENT)
Dept: UROLOGY | Facility: CLINIC | Age: 21
End: 2021-06-01
Payer: COMMERCIAL

## 2021-06-01 VITALS — SYSTOLIC BLOOD PRESSURE: 122 MMHG | DIASTOLIC BLOOD PRESSURE: 82 MMHG | TEMPERATURE: 98 F

## 2021-06-01 DIAGNOSIS — R31.0 GROSS HEMATURIA: ICD-10-CM

## 2021-06-01 PROCEDURE — 99024 POSTOP FOLLOW-UP VISIT: CPT

## 2021-06-01 NOTE — ASSESSMENT
[FreeTextEntry1] : In summary, 20 year old male with persistent painless gross hematuria since Dec 2019 on the left side - OR 8/27 left ureteral narrowing with focal bleeding from left UO. Previous work up on outside included abdominal angiogram (report not available), negative, CT stone protocol negative, outside cysto reportedly positive for blood at the left UO. Recent CT Urogram with venous phase - negative. No hx of stone disease, not sexually active. No Pseudoaneurysms, No Nut Crackers, no visible filling defects associated with his long standing asymptomatic gross hematuria. He is  s/p silver nitrate instillation of left side/ureteral stent placement, presents for removal. He is still bleeding postop, urine described as brown but improving.\par \par 1. Stent (on a string) removed without complications\par 2. Encouraged hydration\par 3. CT Angio negative (patient was experiencing hematuria at time of scan)\par 4. Hematology work up negative\par 5. Cr is stable\par 6. NN RBC scan - Negative, Doppler US left kidney negative.\par 7. refractory hematuria - LEFT Side - case discussed with team at [facility] (silver nitrate instillation and stent to avoid obstruction post treatment)\par \par Follow up 1 month\par Advised on return precautions\par \par \par Thank you very much for allowing me to assist in the care of this patient. Should you have any additional questions or concerns please do not hesitate to contact me.\par \par Sincerely,\par \par Ab Bolivar D.O.\par  of Urology and Radiology\par  of Urology at [facility]\par System Director for Prostate Cancer\par [address], [address]\par Phone: [phone]

## 2021-06-01 NOTE — HISTORY OF PRESENT ILLNESS
[FreeTextEntry1] : Chief Complaint: stent removal, hx gross hematuria\par Date of first visit: 06/16/2020\par \par \par CASE VELAZQUEZ is a 20 year old AA male with hx of painless gross hematuria x 1.5 years with occasional clots who is s/p cystourethroscopy with instillation of silver nitrate/L ureteral stent placement. Denies current fever, dysuria. Reports hematuria since surgery, characterized now as more brown and resolving. Mild discomfort in upper left abdomen and penis when urinating. Still being followed by nephrology (Dr Sherman) and Cr has normalized.\par \par It was felt with limited evidences this is position venous compression issue and since a stent helped stop the bleeding in the past, may support this given a low pressure bleed vs arterial or a mass.  The case was discussed with Margaret Guardado, Edgar and Phylicia.  The decision was made to instill silver nitrate solution to help stop the bleeding.  \par \par Key Clinical Facts.\par never blood on waking but occurs 3-4 hours later then is there all day.  \par Clots are yellow with clots or red clots\par Creatinine has returned to baseline.\par s/p left ureteroscopy (negative) - only finding is narrowed ureter but not obstructed 9/25/20 stent for passive dilation to help with URS placement.  Bleeding stopped with a stent.\par hematology workup negative for coagulopathies.\par \par US 4/19/21: no evidence of renal vein compression\par CT angio 1/21/21 normal study. No etiology of hematuria identified (patient was experiencing hematuria at time of scan)\par CT Urogram 07/01/2020 - Negative for stones, renal mass, hydronephrosis, ureteric calculus.\par \par He initially in prior visit that while he was in [place], he had spontaneous gross hematuria, characterized by red and dark brown color, since mid Dec 2019. He had a work up locally that included angiogram (negative) and cystoscopy revealed "blood at left UO." He saw Urologist in [place] who recommended to have Ureteroscopy on the left, but due to Corona distancing and precautions, this was placed on hold. He also saw a local urologist her referred him here.\par \par Pt underwent Bladder, Prostate and Kidney U/S (12/2019), which reported PVR 5 cc, 11.2 cc prostate volume and normal size kidneys. In addition, he had a CT stone A/P scan from 12/20/2019, which reported no urinary calculi or hydro. He recalls angiogram, but no details or report available. \par \par He reported still having most times light red to brown urine color with occasional small clots, less in the morning and worse in the pm hours. Denies dysuria, denies flank pain, no N/V, no recent abdominal or back blunt trauma, reports occasional suprapubic pressure. Not sexually active\par \par Denies past UTIs, kidney stones. Family hx negative.\par \par The patient denies fevers, chills, nausea and or vomiting and no unexplained weight loss.\par \par All pertinent laboratory, films and physician notes were reviewed.  Questionnaire results were discussed with patient.

## 2021-06-01 NOTE — PHYSICAL EXAM
[General Appearance - Well Developed] : well developed [Abdomen Soft] : soft [Costovertebral Angle Tenderness] : no ~M costovertebral angle tenderness [Urinary Bladder Findings] : the bladder was normal on palpation [Skin Color & Pigmentation] : normal skin color and pigmentation [Edema] : no peripheral edema [Exaggerated Use Of Accessory Muscles For Inspiration] : no accessory muscle use [Oriented To Time, Place, And Person] : oriented to person, place, and time [Affect] : the affect was normal [Mood] : the mood was normal [Normal Station and Gait] : the gait and station were normal for the patient's age [No Focal Deficits] : no focal deficits [Inguinal Lymph Nodes Enlarged Bilaterally] : inguinal [General Appearance - Well Nourished] : well nourished [Normal Appearance] : normal appearance [Well Groomed] : well groomed [General Appearance - In No Acute Distress] : no acute distress [Abdomen Tenderness] : non-tender [] : no hepato-splenomegaly [Urethral Meatus] : meatus normal [Penis Abnormality] : normal circumcised penis [Not Anxious] : not anxious